# Patient Record
Sex: FEMALE | Race: WHITE | Employment: UNEMPLOYED | URBAN - METROPOLITAN AREA
[De-identification: names, ages, dates, MRNs, and addresses within clinical notes are randomized per-mention and may not be internally consistent; named-entity substitution may affect disease eponyms.]

---

## 2023-06-13 ENCOUNTER — TELEPHONE (OUTPATIENT)
Age: 58
End: 2023-06-13

## 2023-06-13 NOTE — TELEPHONE ENCOUNTER
5/22/23 Our office LVM for patient to return our call to schedule office consult with Dr. Mariann Gleason. Referral from Rios Rizo,  [facility]. 6/13/23  I called patient to schedule office consult. Patient states she is still looking for a job so she can get insurance, but then stated she has an [address] obtained through [facility]. I informed her that I would look into if we're in network and return her call.

## 2023-08-31 ENCOUNTER — TELEPHONE (OUTPATIENT)
Age: 58
End: 2023-08-31

## 2023-08-31 NOTE — TELEPHONE ENCOUNTER
I called patient to follow up with scheduling an office visit with Dr. Michael Jimenes for [facility]. Referral from Yolande Barrios 12, referral from Isa Noguera NP. Patient states that she still hasn't gotten a job yet that has good insurance. She states she has insurance off the marketplace but it isn't good and she doesn't want to pay so much out of pocket. She is expecting a job offer hopefully tomorrow and will call back to schedule. I informed her that I would wait for her to call me back, but I would follow up with her if I don't hear back. Patient understands.

## 2023-11-28 ENCOUNTER — TELEPHONE (OUTPATIENT)
Age: 58
End: 2023-11-28

## 2023-11-28 NOTE — TELEPHONE ENCOUNTER
I called patient to follow up with scheduling a office consult with Dr. Waleska Crowder. Referral from Rashaad Dixon. She states she still doesn't have good insurance. She has gotten a job and it should be effective in February. She will call back then. I called [facility]. Spoke to Rina to update them.

## 2024-05-09 ENCOUNTER — TELEPHONE (OUTPATIENT)
Age: 59
End: 2024-05-09

## 2024-05-09 NOTE — TELEPHONE ENCOUNTER
I called to follow up with patient to see if she was ready for an office consult with Dr. Mora. Referral from [facility].  She states she has a new job, but the insurance isn't good.  She has other medical appointments that she has to take care of, so financially she needs to hold off on the appointment.  She understands the importance of the appointment and said she may be ready by July.  I told her we can touch base again at that time.      I called [facility], spoke with Richelle to inform her.